# Patient Record
Sex: MALE | Race: WHITE
[De-identification: names, ages, dates, MRNs, and addresses within clinical notes are randomized per-mention and may not be internally consistent; named-entity substitution may affect disease eponyms.]

---

## 2018-07-11 ENCOUNTER — HOSPITAL ENCOUNTER (EMERGENCY)
Dept: HOSPITAL 46 - ED | Age: 23
Discharge: HOME | End: 2018-07-11
Payer: COMMERCIAL

## 2018-07-11 VITALS — HEIGHT: 75 IN | WEIGHT: 139.99 LBS | BODY MASS INDEX: 17.41 KG/M2

## 2018-07-11 DIAGNOSIS — E86.0: ICD-10-CM

## 2018-07-11 DIAGNOSIS — F90.9: ICD-10-CM

## 2018-07-11 DIAGNOSIS — K21.9: ICD-10-CM

## 2018-07-11 DIAGNOSIS — Z79.899: ICD-10-CM

## 2018-07-11 DIAGNOSIS — S06.0X0A: Primary | ICD-10-CM
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
## 2018-07-11 DIAGNOSIS — F31.9: ICD-10-CM

## 2018-07-11 DIAGNOSIS — F41.9: ICD-10-CM

## 2018-07-11 DIAGNOSIS — Z91.018: ICD-10-CM

## 2018-07-12 NOTE — EKG
[facility]
                                    [address]
                                  [place], [place]  [address]
_________________________________________________________________________________________
                                                                 Signed   
 
 
Normal sinus rhythm
Normal ECG
No previous ECGs available
Confirmed by DANIA XIONG MD ([id number]) on 7/12/2018 2:54:12 PM
 
 
 
 
 
 
 
 
 
 
 
 
 
 
 
 
 
 
 
 
 
 
 
 
 
 
 
 
 
 
 
 
 
 
 
 
 
 
 
 
 
    Electronically Signed By: DANIA XIONG MD  07/12/18 1454
_________________________________________________________________________________________
PATIENT NAME:     ALIZEROSIO        
MEDICAL RECORD #: [id number]                     Electrocardiogram             
          ACCT #: [id number]  
DATE OF BIRTH:   [birth year]                                       
PHYSICIAN:   DANIA XIONG MD           REPORT #: [id number]
REPORT IS CONFIDENTIAL AND NOT TO BE RELEASED WITHOUT AUTHORIZATION

## 2019-10-03 NOTE — XMS
PreManage Notification: ROSIO HERRMANN MRN:[id number]
 
Security Information
 
Security Events
No recent Security Events currently on file
 
 
 
CRITERIA MET
------------
- PDM
 
 
CARE PROVIDERS
-------------------------------------------------------------------------------------
AAKASH SUN      Nurse Practitioner: Family     07/12/2018-Current
 
PHONE: [phone]
-------------------------------------------------------------------------------------
 
[facility] has no Care Guidelines for this patient.
 
[facility] VISIT COUNT (12 MO.)
-------------------------------------------------------------------------------------
2 [facility]
 
1 JOEY P[facility]
-------------------------------------------------------------------------------------
TOTAL 3
-------------------------------------------------------------------------------------
NOTE: Visits indicate total known visits.
 
ED/UCC VISIT TRACKING (12 MO.)
-------------------------------------------------------------------------------------
[facility]2019 23[facility]
 
[facility]: [facility]
 
COMPLAINT:
- RIGHT ARM, SHOULDER PAIN
-------------------------------------------------------------------------------------
07[facility]29/2019 23:14
[facility]
 
[facility]: [facility]
 
DIAGNOSES:
- Other chest pain
- Cp
- Chest Pain
-------------------------------------------------------------------------------------
06/21/2019 09:45
[facility]
 
[facility]
 
DIAGNOSES:
 
- Nausea
- Unspecified abdominal pain
- ABD Pain
- Abdominal Pain
-------------------------------------------------------------------------------------
 
 
INPATIENT VISIT TRACKING (12 MO.)
No inpatient visits to display in this time frame
 
[email or web address]